# Patient Record
Sex: FEMALE | Race: WHITE | NOT HISPANIC OR LATINO | Employment: UNEMPLOYED | URBAN - METROPOLITAN AREA
[De-identification: names, ages, dates, MRNs, and addresses within clinical notes are randomized per-mention and may not be internally consistent; named-entity substitution may affect disease eponyms.]

---

## 2019-03-22 PROBLEM — R10.13 ABDOMINAL PAIN, EPIGASTRIC: Status: ACTIVE | Noted: 2019-03-22

## 2020-05-16 ENCOUNTER — OFFICE VISIT (OUTPATIENT)
Dept: URGENT CARE | Facility: CLINIC | Age: 40
End: 2020-05-16
Payer: COMMERCIAL

## 2020-05-16 VITALS
HEART RATE: 73 BPM | SYSTOLIC BLOOD PRESSURE: 139 MMHG | TEMPERATURE: 98 F | WEIGHT: 197 LBS | OXYGEN SATURATION: 99 % | HEIGHT: 64 IN | BODY MASS INDEX: 33.63 KG/M2 | DIASTOLIC BLOOD PRESSURE: 62 MMHG

## 2020-05-16 DIAGNOSIS — L02.415 ABSCESS OF RIGHT LOWER LEG: Primary | ICD-10-CM

## 2020-05-16 PROCEDURE — 73590 XR TIBIA FIBULA 2 VIEW RIGHT: ICD-10-PCS | Mod: RT,S$GLB,, | Performed by: RADIOLOGY

## 2020-05-16 PROCEDURE — 73590 X-RAY EXAM OF LOWER LEG: CPT | Mod: RT,S$GLB,, | Performed by: RADIOLOGY

## 2020-05-16 PROCEDURE — 99203 PR OFFICE/OUTPT VISIT, NEW, LEVL III, 30-44 MIN: ICD-10-PCS | Mod: S$GLB,,, | Performed by: NURSE PRACTITIONER

## 2020-05-16 PROCEDURE — 99203 OFFICE O/P NEW LOW 30 MIN: CPT | Mod: S$GLB,,, | Performed by: NURSE PRACTITIONER

## 2020-05-16 RX ORDER — ERGOCALCIFEROL 1.25 MG/1
50000 CAPSULE ORAL
COMMUNITY

## 2020-05-16 RX ORDER — SULFAMETHOXAZOLE AND TRIMETHOPRIM 800; 160 MG/1; MG/1
1 TABLET ORAL EVERY 12 HOURS
Qty: 20 TABLET | Refills: 0 | Status: SHIPPED | OUTPATIENT
Start: 2020-05-16 | End: 2020-05-26

## 2020-05-16 RX ORDER — MUPIROCIN 20 MG/G
OINTMENT TOPICAL
Qty: 22 G | Refills: 1 | Status: SHIPPED | OUTPATIENT
Start: 2020-05-16

## 2020-05-16 NOTE — PROGRESS NOTES
"Subjective:       Patient ID: Lola Mendoza is a 40 y.o. female.    Vitals:  height is 5' 4" (1.626 m) and weight is 89.4 kg (197 lb). Her oral temperature is 97.5 °F (36.4 °C). Her blood pressure is 139/62 and her pulse is 73. Her oxygen saturation is 99%.     Chief Complaint: Leg Injury    Pt states she is concerned she broke her shin as she has a red area right lower anterior leg. Has been climbing ladders repeatedly last few weeks painting her house she states and has hit that part of leg many times, no falls. She notes another healed abrasion site approximately an inch below reddenned area as well.     Leg Pain    Incident onset: 4 days ago  The incident occurred at home. The injury mechanism is unknown. The pain is present in the right leg. The quality of the pain is described as aching. The pain is mild. The pain has been constant since onset. Pertinent negatives include no inability to bear weight, loss of motion, loss of sensation, muscle weakness, numbness or tingling. She reports no foreign bodies present. The symptoms are aggravated by movement and palpation. She has tried nothing for the symptoms.       Constitution: Negative for chills, sweating, fatigue and fever.   HENT: Negative for facial swelling, congestion and sore throat.    Neck: Negative for neck pain, neck stiffness and painful lymph nodes.   Cardiovascular: Negative for chest trauma, chest pain and leg swelling.   Eyes: Negative for double vision and blurred vision.   Respiratory: Negative for chest tightness, cough and shortness of breath.    Gastrointestinal: Negative for abdominal trauma, abdominal pain, nausea, vomiting and diarrhea.   Genitourinary: Negative for dysuria, frequency, urgency, history of kidney stones, genital trauma and pelvic pain.   Musculoskeletal: Positive for pain (right anterior lower leg). Negative for joint pain, joint swelling, back pain, muscle cramps and muscle ache.   Skin: Positive for color change " and erythema. Negative for pale, rash and bruising.   Allergic/Immunologic: Negative for seasonal allergies and itching.   Neurological: Negative for dizziness, history of vertigo, light-headedness, passing out, headaches, numbness and tingling.   Hematologic/Lymphatic: Negative for swollen lymph nodes and easy bruising/bleeding. Does not bruise/bleed easily.   Psychiatric/Behavioral: Negative for nervous/anxious, sleep disturbance and depression. The patient is not nervous/anxious.        Objective:      Physical Exam   Constitutional: She appears well-developed and well-nourished. No distress.   HENT:   Head: Normocephalic and atraumatic.   Eyes: Conjunctivae are normal. Right eye exhibits no discharge. Left eye exhibits no discharge. No scleral icterus.   Neck: Normal range of motion. Neck supple. No tracheal deviation present.   Cardiovascular: Normal rate.   Pulmonary/Chest: Effort normal. No respiratory distress. She has no wheezes.   RR 16, normal RA sats, easy unlabored respiration.   Skin: Skin is warm, dry, not diaphoretic, not pale and no rash.   Pt with 1cm erythematous mildly indurated lesion right lower anterior shin that is ttp, no red streaking c/w early cellulitis and abscess. Lesions:  erythema                Assessment:       1. Abscess of right lower leg        Plan:     alert nontoxic and in no acute distress.  Afebrile.  Patient with early abscess/cellulitis to right lower extremity, nonfluctuant at this time.  Will treat with p.o. antibiotics, Bactroban, warm compresses.  Right tib/fib xray completed, reviewed on pacs, no acute fracture or evidence osteo, RADs report consistent. Advised on signs symptoms indicate need to return to urgent care for drainage, signs symptoms to seek emergency care.  Verbalizes understanding and agreement with treatment plan.    Abscess of right lower leg  -     X-Ray Tibia Fibula 2 View Right; Future; Expected date: 05/16/2020  -     mupirocin (BACTROBAN) 2 %  ointment; Apply to affected area 3 times daily  Dispense: 22 g; Refill: 1  -     sulfamethoxazole-trimethoprim 800-160mg (BACTRIM DS) 800-160 mg Tab; Take 1 tablet by mouth every 12 (twelve) hours. for 10 days  Dispense: 20 tablet; Refill: 0      X-ray Tibia Fibula 2 View Right    Result Date: 5/16/2020  EXAMINATION: XR TIBIA FIBULA 2 VIEW RIGHT CLINICAL HISTORY: Cutaneous abscess of right lower limb TECHNIQUE: AP and lateral views of the right tibia and fibula were performed. COMPARISON: None. FINDINGS: No fracture.  No lytic or blastic lesion.  Soft tissues are unremarkable.     As above. Electronically signed by: Americo Rosenberg MD Date:    05/16/2020 Time:    13:22             Patient Instructions   Limit direct sun exposure while on antibiotics to short time frames. Some antibiotics will cause patients to break out in a rash with extended sun exposure.     Elevated Blood Pressure  Your blood pressure was elevated during your visit to the urgent care today.  It was not so high that immediate care was needed, but it is recommended that you monitor your blood pressure over the next week or two to make sure that it is not staying elevated.  If you are on blood pressure medication currently, continue as already prescribed. Please have your blood pressure taken 2-3 times daily at different times of the day.  Keep a log of these blood pressure readings and take it with you to see your Primary Care Physician.  Bring today's discharge papers as well to your follow up appointment. If your blood pressure is consistently above 140/90, you should follow-up with your PCP without delay. If you develop chest pain, shortness of breath, dizziness, vision changes, or any other concerning symptoms, you should seek immediate care in the Emergency Department.        1.  You have been diagnosed with an abscess/cellulitis. Your abscess was not ready to be drained at this time, but may be ready in a few days. In order to help this  "along, you should apply warm, moist heat to this area for 10-20 minutes at a time 3-5 times daily over the next several days. As long as the wound is still closed, you can also perform warm, Epsom salt soaks for 20 minutes at a time 3-5 times daily. You should no longer soak in warm water once the wound is open.   2.  You can also apply Domeboro (over the counter) to the area. This helps to "pull infection out of the wound".  3.  Take all medications as directed. Make sure to complete your antibiotics.   4.  Rest and keep yourself/patient well hydrated. For adults, it is recommended to drink at least 8-10 glasses of water daily.   5.  For patients above 6 months of age who are not allergic to and are not on anticoagulants, you can alternate Tylenol and Motrin every 4-6 hours for fever above 100.4F and/or pain.  For patients less than 6 months of age, allergic to or intolerant to NSAIDS, have gastritis, gastric ulcers, or history of GI bleeds, are pregnant, or are on anticoagulant therapy, you can take Tylenol every 4 hours as needed for fever above 100.4F and/or pain.   6. You should return to Urgent Care or schedule a follow-up appointment with your Primary Care Provider/Pediatrician for recheck in 2-3 days or as directed at this visit.  You abscess may be ready to be drained at this time, especially if you have followed the above instructions.   7.  If your condition worsens at any time, you should report immediately to your nearest Emergency Department for further evaluation. **You must understand that you have received Urgent Care treatment only and that you may be released before all of your medical problems are known or treated. You, the patient, are responsible to arrange for follow-up care as instructed.       ·   ·   ·   ·   · Follow up with your primary care in 2-5 days if symptoms have not improved, or you may return here.  · If you were referred to a specialist, please follow up with that " specialty.  · If you were prescribed antibiotics, please take them to completion.  · If you were prescribed a narcotic or any medication with sedative effects, do not drive or operate heavy equipment or machinery while taking these medications.  · You must understand that you have received treatment at an Urgent Care facility only, and that you may be released before all of your medical problems are known or treated. Urgent Care facilities are not equipped to handle life threatening emergencies. It is recommended that you go to an Emergency Department for further evaluation of worsening or concerning symptoms, or possibly life threatening conditions as discussed.                                        If you  smoke, please stop smoking

## 2020-05-16 NOTE — PATIENT INSTRUCTIONS
"Limit direct sun exposure while on antibiotics to short time frames. Some antibiotics will cause patients to break out in a rash with extended sun exposure.     Elevated Blood Pressure  Your blood pressure was elevated during your visit to the urgent care today.  It was not so high that immediate care was needed, but it is recommended that you monitor your blood pressure over the next week or two to make sure that it is not staying elevated.  If you are on blood pressure medication currently, continue as already prescribed. Please have your blood pressure taken 2-3 times daily at different times of the day.  Keep a log of these blood pressure readings and take it with you to see your Primary Care Physician.  Bring today's discharge papers as well to your follow up appointment. If your blood pressure is consistently above 140/90, you should follow-up with your PCP without delay. If you develop chest pain, shortness of breath, dizziness, vision changes, or any other concerning symptoms, you should seek immediate care in the Emergency Department.        1.  You have been diagnosed with an abscess/cellulitis. Your abscess was not ready to be drained at this time, but may be ready in a few days. In order to help this along, you should apply warm, moist heat to this area for 10-20 minutes at a time 3-5 times daily over the next several days. As long as the wound is still closed, you can also perform warm, Epsom salt soaks for 20 minutes at a time 3-5 times daily. You should no longer soak in warm water once the wound is open.   2.  You can also apply Domeboro (over the counter) to the area. This helps to "pull infection out of the wound".  3.  Take all medications as directed. Make sure to complete your antibiotics.   4.  Rest and keep yourself/patient well hydrated. For adults, it is recommended to drink at least 8-10 glasses of water daily.   5.  For patients above 6 months of age who are not allergic to and are not on " anticoagulants, you can alternate Tylenol and Motrin every 4-6 hours for fever above 100.4F and/or pain.  For patients less than 6 months of age, allergic to or intolerant to NSAIDS, have gastritis, gastric ulcers, or history of GI bleeds, are pregnant, or are on anticoagulant therapy, you can take Tylenol every 4 hours as needed for fever above 100.4F and/or pain.   6. You should return to Urgent Care or schedule a follow-up appointment with your Primary Care Provider/Pediatrician for recheck in 2-3 days or as directed at this visit.  You abscess may be ready to be drained at this time, especially if you have followed the above instructions.   7.  If your condition worsens at any time, you should report immediately to your nearest Emergency Department for further evaluation. **You must understand that you have received Urgent Care treatment only and that you may be released before all of your medical problems are known or treated. You, the patient, are responsible to arrange for follow-up care as instructed.       ·   ·   ·   ·   · Follow up with your primary care in 2-5 days if symptoms have not improved, or you may return here.  · If you were referred to a specialist, please follow up with that specialty.  · If you were prescribed antibiotics, please take them to completion.  · If you were prescribed a narcotic or any medication with sedative effects, do not drive or operate heavy equipment or machinery while taking these medications.  · You must understand that you have received treatment at an Urgent Care facility only, and that you may be released before all of your medical problems are known or treated. Urgent Care facilities are not equipped to handle life threatening emergencies. It is recommended that you go to an Emergency Department for further evaluation of worsening or concerning symptoms, or possibly life threatening conditions as discussed.                                        If you  smoke, please  stop smoking

## 2021-05-10 ENCOUNTER — PATIENT MESSAGE (OUTPATIENT)
Dept: RESEARCH | Facility: HOSPITAL | Age: 41
End: 2021-05-10

## 2025-03-16 ENCOUNTER — OFFICE VISIT (OUTPATIENT)
Dept: URGENT CARE | Facility: CLINIC | Age: 45
End: 2025-03-16
Payer: COMMERCIAL

## 2025-03-16 VITALS
DIASTOLIC BLOOD PRESSURE: 77 MMHG | TEMPERATURE: 99 F | HEIGHT: 64 IN | BODY MASS INDEX: 28.17 KG/M2 | RESPIRATION RATE: 17 BRPM | OXYGEN SATURATION: 99 % | WEIGHT: 165 LBS | HEART RATE: 112 BPM | SYSTOLIC BLOOD PRESSURE: 109 MMHG

## 2025-03-16 DIAGNOSIS — J03.90 TONSILLITIS WITH EXUDATE: Primary | ICD-10-CM

## 2025-03-16 DIAGNOSIS — J39.2 PHARYNGEAL SWELLING: ICD-10-CM

## 2025-03-16 LAB
CTP QC/QA: YES
MOLECULAR STREP A: POSITIVE

## 2025-03-16 PROCEDURE — 87651 STREP A DNA AMP PROBE: CPT | Mod: QW,S$GLB,, | Performed by: NURSE PRACTITIONER

## 2025-03-16 PROCEDURE — 99203 OFFICE O/P NEW LOW 30 MIN: CPT | Mod: 25,S$GLB,, | Performed by: NURSE PRACTITIONER

## 2025-03-16 PROCEDURE — 96372 THER/PROPH/DIAG INJ SC/IM: CPT | Mod: S$GLB,,, | Performed by: NURSE PRACTITIONER

## 2025-03-16 RX ORDER — DEXAMETHASONE SODIUM PHOSPHATE 10 MG/ML
8 INJECTION INTRAMUSCULAR; INTRAVENOUS
Status: COMPLETED | OUTPATIENT
Start: 2025-03-16 | End: 2025-03-16

## 2025-03-16 RX ORDER — AMOXICILLIN AND CLAVULANATE POTASSIUM 875; 125 MG/1; MG/1
1 TABLET, FILM COATED ORAL 2 TIMES DAILY
Qty: 20 TABLET | Refills: 0 | Status: SHIPPED | OUTPATIENT
Start: 2025-03-16 | End: 2025-03-26

## 2025-03-16 RX ADMIN — DEXAMETHASONE SODIUM PHOSPHATE 8 MG: 10 INJECTION INTRAMUSCULAR; INTRAVENOUS at 12:03

## 2025-03-16 NOTE — PATIENT INSTRUCTIONS
Strep Throat   You came to Urgent Care for a sore throat. The staff did tests and found that you have strep throat, which is an infection caused by bacteria. Most of the time, you will need antibiotics to treat strep throat.     The antibiotics can help prevent other problems that strep throat can sometimes cause. Often, you will feel better in a few days, but it is very important to finish all of your antibiotics.    Strep throat is contagious until 24 hours after you begin taking antibiotics. During this time, avoid contact with other people at work, school, or home, especially infants and children.     You may gargle with warm salt water several times a day to help reduce swelling and relieve pain. Mix 1/2 teaspoon of salt in 1 cup of warm water.    Chloraseptic sprays and throat lozenges will also help with your throat pain.    Make sure to get plenty of rest and stay well hydrated.     For patients above 6 months of age who are not allergic to and are not on anticoagulants, you can alternate Tylenol and Motrin every 4-6 hours for fever above 100.4F and/or pain.  For patients less than 6 months of age, allergic to or intolerant to NSAIDS, have gastritis, gastric ulcers, or history of GI bleeds, are pregnant, or are on anticoagulant therapy, you can take Tylenol every 4 hours as needed for fever above 100.4F and/or pain.     Change your toothbrush 24 hours after starting antibiotics to prevent reinfection.    Watch closely for changes in your health, and be sure to contact your doctor if:  You are not getting better after 2 days (48 hours) after taking an antibiotic.    If your condition fails to improve in a timely manner, you should receive another evaluation by your Primary Care Provider/Pediatrician to discuss your concerns or return to urgent care for a recheck.      Report immediately to your nearest Emergency Department for further evaluation if new or worsening symptoms develop including but not limited  to:  A new or higher fever.  A fever with a stiff neck or severe headache.  New or worse trouble swallowing.  Pain that becomes much worse on one side of your throat.      . **You must understand that you have received Urgent Care treatment only and that you may be released before all your medical problems are known or treated. You, the patient, are responsible to arrange for follow-up care as instructed.

## 2025-03-16 NOTE — PROGRESS NOTES
"Subjective:      Patient ID: Lola Mendoza is a 45 y.o. female.    Vitals:  height is 5' 4" (1.626 m) and weight is 74.8 kg (165 lb). Her oral temperature is 98.7 °F (37.1 °C). Her blood pressure is 109/77 and her pulse is 112 (abnormal). Her respiration is 17 and oxygen saturation is 99%.     Chief Complaint: Sore Throat    Sore Throat   This is a new problem. The current episode started yesterday. The problem has been rapidly worsening. There has been no fever (pt states her body felt warm). The pain is at a severity of 6/10. The pain is moderate. Associated symptoms include congestion (mild), swollen glands and trouble swallowing. Pertinent negatives include no abdominal pain, coughing, diarrhea, drooling, ear pain, headaches, shortness of breath or vomiting. Treatments tried: ibuprofen @ 9:30 am. The treatment provided no relief.       HENT:  Positive for congestion (mild), sore throat and trouble swallowing. Negative for ear pain and drooling.    Respiratory:  Negative for cough and shortness of breath.    Gastrointestinal:  Negative for abdominal pain, vomiting and diarrhea.   Neurological:  Negative for headaches.      Objective:     Physical Exam   Constitutional: She is oriented to person, place, and time. She appears well-developed. She is cooperative.  Non-toxic appearance. She appears ill. No distress.   HENT:   Head: Normocephalic and atraumatic.   Ears:   Right Ear: Tympanic membrane, external ear and ear canal normal.   Left Ear: External ear and ear canal normal. A middle ear effusion is present.   Nose: Nose normal. No mucosal edema, rhinorrhea or nasal deformity. No epistaxis. Right sinus exhibits no maxillary sinus tenderness and no frontal sinus tenderness. Left sinus exhibits no maxillary sinus tenderness and no frontal sinus tenderness.   Mouth/Throat: Uvula is midline and mucous membranes are normal. No trismus in the jaw. Normal dentition. No uvula swelling. Oropharyngeal exudate " (+++), posterior oropharyngeal edema and posterior oropharyngeal erythema present. Tonsils are 3+ on the right. Tonsils are 3+ on the left. Tonsillar exudate.   Eyes: Conjunctivae and lids are normal. No scleral icterus.   Neck: Trachea normal and phonation normal. Neck supple. No edema present. No erythema present. No neck rigidity present.   Cardiovascular: Normal rate, regular rhythm, normal heart sounds and normal pulses.   Pulmonary/Chest: Effort normal and breath sounds normal. No respiratory distress. She has no decreased breath sounds. She has no rhonchi.   Abdominal: Normal appearance.   Musculoskeletal: Normal range of motion.         General: No deformity. Normal range of motion.   Lymphadenopathy:     She has cervical adenopathy.   Neurological: She is alert and oriented to person, place, and time. She exhibits normal muscle tone. Coordination normal.   Skin: Skin is warm, dry, intact, not diaphoretic and not pale.   Psychiatric: Her speech is normal and behavior is normal. Judgment and thought content normal.   Nursing note and vitals reviewed.      Assessment:     1. Tonsillitis with exudate    2. Pharyngeal swelling        Plan:     Results for orders placed or performed in visit on 03/16/25   POCT Strep A, Molecular    Collection Time: 03/16/25 12:21 PM   Result Value Ref Range    Molecular Strep A, POC Positive (A) Negative     Acceptable Yes       Tonsillitis with exudate  -     POCT Strep A, Molecular  -     amoxicillin-clavulanate 875-125mg (AUGMENTIN) 875-125 mg per tablet; Take 1 tablet by mouth 2 (two) times daily. for 10 days  Dispense: 20 tablet; Refill: 0    Pharyngeal swelling  -     dexAMETHasone injection 8 mg                     none

## 2025-03-31 PROBLEM — N92.0 EXCESSIVE OR FREQUENT MENSTRUATION: Status: ACTIVE | Noted: 2025-03-31

## 2025-03-31 PROBLEM — D50.9 IRON DEFICIENCY ANEMIA: Status: ACTIVE | Noted: 2025-03-31

## 2025-04-04 ENCOUNTER — OFFICE VISIT (OUTPATIENT)
Dept: URGENT CARE | Facility: CLINIC | Age: 45
End: 2025-04-04
Payer: COMMERCIAL

## 2025-04-04 VITALS
SYSTOLIC BLOOD PRESSURE: 100 MMHG | OXYGEN SATURATION: 98 % | RESPIRATION RATE: 18 BRPM | HEIGHT: 64 IN | DIASTOLIC BLOOD PRESSURE: 66 MMHG | BODY MASS INDEX: 27.6 KG/M2 | HEART RATE: 99 BPM | WEIGHT: 161.69 LBS | TEMPERATURE: 97 F

## 2025-04-04 DIAGNOSIS — J02.0 STREP PHARYNGITIS: Primary | ICD-10-CM

## 2025-04-04 DIAGNOSIS — J02.9 SORE THROAT: ICD-10-CM

## 2025-04-04 LAB
CTP QC/QA: YES
MOLECULAR STREP A: POSITIVE

## 2025-04-04 RX ORDER — PHENTERMINE HYDROCHLORIDE 37.5 MG/1
37.5 TABLET ORAL
COMMUNITY
Start: 2025-02-27

## 2025-04-04 RX ORDER — AZITHROMYCIN 500 MG/1
500 TABLET, FILM COATED ORAL DAILY
Qty: 5 TABLET | Refills: 0 | Status: SHIPPED | OUTPATIENT
Start: 2025-04-04 | End: 2025-04-09

## 2025-04-04 NOTE — PROGRESS NOTES
"Subjective:      Patient ID: Lola Mendoza is a 45 y.o. female.    Vitals:  height is 5' 4" (1.626 m) and weight is 73.4 kg (161 lb 11.3 oz). Her oral temperature is 96.9 °F (36.1 °C). Her blood pressure is 100/66 and her pulse is 99. Her respiration is 18 and oxygen saturation is 98%.     Chief Complaint: Sore Throat    Patient reports sore throat began again yesterday with "white spots" on her tonsils. Pt was diagnosed with strep 3/16/25 and prescribed Augmentin for 10 days. Stated she took all abx and threw away her toothbrush.     Sore Throat   This is a new problem. The current episode started yesterday. The problem has been unchanged. Neither side of throat is experiencing more pain than the other. There has been no fever. The pain is at a severity of 2/10. The pain is mild. Associated symptoms include congestion and swollen glands. Pertinent negatives include no diarrhea, headaches or trouble swallowing. She has tried nothing for the symptoms.       HENT:  Positive for congestion and sore throat. Negative for trouble swallowing.    Gastrointestinal:  Negative for diarrhea.   Neurological:  Negative for headaches.      Objective:     Physical Exam   Constitutional:  Non-toxic appearance. She does not appear ill. No distress.   HENT:   Head: Normocephalic and atraumatic.   Ears:   Right Ear: Tympanic membrane, external ear and ear canal normal.   Left Ear: Tympanic membrane, external ear and ear canal normal.   Nose: No rhinorrhea or congestion.   Mouth/Throat: Uvula is midline. Mucous membranes are moist. Posterior oropharyngeal erythema present. No posterior oropharyngeal edema. Tonsillar exudate. Oropharynx is clear.   Eyes: Conjunctivae are normal.   Neck: Neck supple. No neck rigidity present.   Cardiovascular: Normal rate and regular rhythm.   Pulmonary/Chest: Effort normal. No respiratory distress. She has no wheezes. She has no rhonchi.   Abdominal: Normal appearance.   Lymphadenopathy:     " She has cervical adenopathy.   Neurological: no focal deficit. She is alert.   Skin: Skin is warm, dry and not diaphoretic.   Psychiatric: Her behavior is normal. Judgment and thought content normal.   Nursing note and vitals reviewed.    Results for orders placed or performed in visit on 04/04/25   POCT Strep A, Molecular    Collection Time: 04/04/25  4:46 PM   Result Value Ref Range    Molecular Strep A, POC Positive (A) Negative     Acceptable Yes          Assessment:     1. Strep pharyngitis    2. Sore throat        Plan:       Strep pharyngitis  -     azithromycin (ZITHROMAX) 500 MG tablet; Take 1 tablet (500 mg total) by mouth once daily. for 5 days  Dispense: 5 tablet; Refill: 0    Sore throat  -     POCT Strep A, Molecular      Strep positive, discussed results with patient.   Strep Throat   You came to Urgent Care for a sore throat. The staff did tests and found that you have strep throat, which is an infection caused by bacteria. Most of the time, you will need antibiotics to treat strep throat.     The antibiotics can help prevent other problems that strep throat can sometimes cause. Often, you will feel better in a few days, but it is very important to finish all of your antibiotics.    Strep throat is contagious until 24 hours after you begin taking antibiotics. During this time, avoid contact with other people at work, school, or home, especially infants and children.     You may gargle with warm salt water several times a day to help reduce swelling and relieve pain. Mix 1/2 teaspoon of salt in 1 cup of warm water.    Chloraseptic sprays and throat lozenges will also help with your throat pain.    Make sure to get plenty of rest and stay well hydrated.     For patients above 6 months of age who are not allergic to and are not on anticoagulants, you can alternate Tylenol and Motrin every 4-6 hours for fever above 100.4F and/or pain.  For patients less than 6 months of age, allergic to or  intolerant to NSAIDS, have gastritis, gastric ulcers, or history of GI bleeds, are pregnant, or are on anticoagulant therapy, you can take Tylenol every 4 hours as needed for fever above 100.4F and/or pain.     Change your toothbrush 24 hours after starting antibiotics to prevent reinfection.    Watch closely for changes in your health, and be sure to contact your doctor if:  You are not getting better after 2 days (48 hours) after taking an antibiotic.    If your condition fails to improve in a timely manner, you should receive another evaluation by your Primary Care Provider/Pediatrician to discuss your concerns or return to urgent care for a recheck.      Report immediately to your nearest Emergency Department for further evaluation if new or worsening symptoms develop including but not limited to:  A new or higher fever.  A fever with a stiff neck or severe headache.  New or worse trouble swallowing.  Pain that becomes much worse on one side of your throat.      . **You must understand that you have received Urgent Care treatment only and that you may be released before all your medical problems are known or treated. You, the patient, are responsible to arrange for follow-up care as instructed.

## 2025-04-04 NOTE — PATIENT INSTRUCTIONS
You must understand that you have received treatment at an Urgent Care facility only, and that you may be  released before all of your medical problems are known or treated. Urgent Care facilities are not equipped to  handle life threatening emergencies. It is recommended that you seek care at an Emergency Department for  further evaluation of worsening or concerning symptoms, or possibly life threatening conditions as  discussed. Strep Throat   You came to Urgent Care for a sore throat. The staff did tests and found that you have strep throat, which is an infection caused by bacteria. Most of the time, you will need antibiotics to treat strep throat.     The antibiotics can help prevent other problems that strep throat can sometimes cause. Often, you will feel better in a few days, but it is very important to finish all of your antibiotics.    Strep throat is contagious until 24 hours after you begin taking antibiotics. During this time, avoid contact with other people at work, school, or home, especially infants and children.     You may gargle with warm salt water several times a day to help reduce swelling and relieve pain. Mix 1/2 teaspoon of salt in 1 cup of warm water.    Chloraseptic sprays and throat lozenges will also help with your throat pain.    Make sure to get plenty of rest and stay well hydrated.     For patients above 6 months of age who are not allergic to and are not on anticoagulants, you can alternate Tylenol and Motrin every 4-6 hours for fever above 100.4F and/or pain.  For patients less than 6 months of age, allergic to or intolerant to NSAIDS, have gastritis, gastric ulcers, or history of GI bleeds, are pregnant, or are on anticoagulant therapy, you can take Tylenol every 4 hours as needed for fever above 100.4F and/or pain.     Change your toothbrush 24 hours after starting antibiotics to prevent reinfection.    Watch closely for changes in your health, and be sure to contact your doctor  if:  You are not getting better after 2 days (48 hours) after taking an antibiotic.    If your condition fails to improve in a timely manner, you should receive another evaluation by your Primary Care Provider/Pediatrician to discuss your concerns or return to urgent care for a recheck.      Report immediately to your nearest Emergency Department for further evaluation if new or worsening symptoms develop including but not limited to:  A new or higher fever.  A fever with a stiff neck or severe headache.  New or worse trouble swallowing.  Pain that becomes much worse on one side of your throat.      . **You must understand that you have received Urgent Care treatment only and that you may be released before all your medical problems are known or treated. You, the patient, are responsible to arrange for follow-up care as instructed.